# Patient Record
Sex: FEMALE | Race: WHITE | Employment: UNEMPLOYED | URBAN - METROPOLITAN AREA
[De-identification: names, ages, dates, MRNs, and addresses within clinical notes are randomized per-mention and may not be internally consistent; named-entity substitution may affect disease eponyms.]

---

## 2021-10-02 ENCOUNTER — APPOINTMENT (OUTPATIENT)
Dept: GENERAL RADIOLOGY | Age: 31
End: 2021-10-02
Payer: COMMERCIAL

## 2021-10-02 ENCOUNTER — HOSPITAL ENCOUNTER (EMERGENCY)
Age: 31
Discharge: HOME | End: 2021-10-02
Attending: EMERGENCY MEDICINE
Payer: COMMERCIAL

## 2021-10-02 VITALS
HEIGHT: 62 IN | OXYGEN SATURATION: 99 % | HEART RATE: 99 BPM | BODY MASS INDEX: 26.68 KG/M2 | TEMPERATURE: 98.7 F | RESPIRATION RATE: 16 BRPM | DIASTOLIC BLOOD PRESSURE: 87 MMHG | SYSTOLIC BLOOD PRESSURE: 126 MMHG | WEIGHT: 145 LBS

## 2021-10-02 DIAGNOSIS — R31.9 HEMATURIA, UNSPECIFIED TYPE: Primary | ICD-10-CM

## 2021-10-02 LAB
-: ABNORMAL
AMORPHOUS: ABNORMAL
BACTERIA: ABNORMAL
BILIRUBIN URINE: NEGATIVE
CASTS UA: ABNORMAL /LPF
COLOR: YELLOW
COMMENT UA: ABNORMAL
CRYSTALS, UA: ABNORMAL /HPF
EPITHELIAL CELLS UA: ABNORMAL /HPF (ref 0–5)
GLUCOSE URINE: NEGATIVE
HCG(URINE) PREGNANCY TEST: NEGATIVE
KETONES, URINE: NEGATIVE
LEUKOCYTE ESTERASE, URINE: NEGATIVE
MUCUS: ABNORMAL
NITRITE, URINE: NEGATIVE
OTHER OBSERVATIONS UA: ABNORMAL
PH UA: 6 (ref 5–8)
PROTEIN UA: NEGATIVE
RBC UA: ABNORMAL /HPF (ref 0–2)
RENAL EPITHELIAL, UA: ABNORMAL /HPF
SPECIFIC GRAVITY UA: 1.02 (ref 1–1.03)
TRICHOMONAS: ABNORMAL
TURBIDITY: CLEAR
URINE HGB: ABNORMAL
UROBILINOGEN, URINE: ABNORMAL
WBC UA: ABNORMAL /HPF (ref 0–5)
YEAST: ABNORMAL

## 2021-10-02 PROCEDURE — 81001 URINALYSIS AUTO W/SCOPE: CPT

## 2021-10-02 PROCEDURE — 99284 EMERGENCY DEPT VISIT MOD MDM: CPT

## 2021-10-02 PROCEDURE — 81025 URINE PREGNANCY TEST: CPT

## 2021-10-02 PROCEDURE — 74018 RADEX ABDOMEN 1 VIEW: CPT

## 2021-10-02 RX ORDER — NITROFURANTOIN 25; 75 MG/1; MG/1
100 CAPSULE ORAL 2 TIMES DAILY
Qty: 14 CAPSULE | Refills: 0 | Status: SHIPPED | OUTPATIENT
Start: 2021-10-02 | End: 2021-10-09

## 2021-10-02 ASSESSMENT — ENCOUNTER SYMPTOMS
DIARRHEA: 0
VOMITING: 0
BACK PAIN: 0
SHORTNESS OF BREATH: 0

## 2021-10-02 ASSESSMENT — PAIN SCALES - GENERAL: PAINLEVEL_OUTOF10: 0

## 2021-10-02 NOTE — ED PROVIDER NOTES
ATTENDING  ADDENDUM     Care of this patient was assumed from Dr. Tonie Martin. The patient was seen for Hematuria (hematuria since august. denies any pain. reports sometimes has trouble emptying bladder fully. was seen at urgent care today and told to go to ER for possible kidney stone.)  . The patient's initial evaluation and plan have been discussed with the prior provider who initially evaluated the patient. Nursing Notes, Past Medical Hx, Past Surgical Hx, Social Hx, Allergies, and Family Hx were all reviewed. ED COURSE      The patient was given the following medications:  Orders Placed This Encounter   Medications    nitrofurantoin, macrocrystal-monohydrate, (MACROBID) 100 MG capsule     Sig: Take 1 capsule by mouth 2 times daily for 7 days     Dispense:  14 capsule     Refill:  0       RECENT VITALS:   Temp: 98.7 °F (37.1 °C), Pulse: 99, Resp: 16, BP: 126/87    MEDICAL DECISION MAKING       40-year-old female presented to the emergency department for evaluation of hematuria. UA does show hematuria. KUB shows no stones. Plan for treatment for urinary tract infection with Macrobid. Close follow-up with her primary care doctor.     Impression: Hematuria      Gogo Waller MD  Emergency Medicine Attending        Gogo Waller MD  10/02/21 65 WARREN. Sanjay Bah MD  10/02/21 [id number]

## 2021-10-02 NOTE — ED PROVIDER NOTES
[address] [facility] ED  [address] [facility] [address]  Phone: [phone]  Fax: [address] [facility]  [place]      Pt Name: Christophe Kumar  MRN: [id number]  [place] 1990  Date of evaluation: 10/2/2021  Provider: Indu Atkinson, [facility]       Chief Complaint   Patient presents with    Hematuria     hematuria since august. denies any pain. reports sometimes has trouble emptying bladder fully. was seen at urgent care today and told to go to ER for possible kidney stone. HISTORY OF PRESENT ILLNESS   (Location/Symptom, Timing/Onset,Context/Setting, Quality, Duration, Modifying Factors, Severity)  Note limiting factors. Christophe Kumar is a 27 y.o. female who presents to the emergency department for the evaluation of some blood in her urine. Patient states she feels she has been seeing this since August.  She states when she sits down on the toilet she feels like it takes a long time to start her urine stream.  She does not have any dysuria. She has no abdominal pain or flank pain. She has no back pain. No concern for pregnancy or STD. She has no nausea, vomiting or diarrhea. She was seen at urgent care, apparently she had some blood in her urine without signs of infection and they sent her to the ER    Nursing Notes were reviewed. REVIEW OF SYSTEMS    (2-9systems for level 4, 10 or more for level 5)     Review of Systems   Respiratory: Negative for shortness of breath. Cardiovascular: Negative for chest pain. Gastrointestinal: Negative for diarrhea and vomiting. Genitourinary: Positive for hematuria. Negative for dysuria. Hesitancy   Musculoskeletal: Negative for back pain. Skin: Negative for rash. Neurological: Negative for weakness. All other systems reviewed and are negative. Except asnoted above the remainder of the review of systems was reviewed and negative.        PAST MEDICAL level 5)     ED Triage Vitals   BP Temp Temp Source Pulse Resp SpO2 Height Weight   10/02/21 1819 10/02/21 1832 10/02/21 1832 10/02/21 1819 10/02/21 1819 10/02/21 1819 10/02/21 1832 10/02/21 1832   126/87 98.7 °F (37.1 °C) Oral 99 16 99 % 5' 2\" (1.575 m) 145 lb (65.8 kg)       Physical Exam  Vitals and nursing note reviewed. Constitutional:       General: She is not in acute distress. Appearance: Normal appearance. She is not ill-appearing or toxic-appearing. HENT:      Head: Normocephalic and atraumatic. Nose: Nose normal. No congestion. Mouth/Throat:      Mouth: Mucous membranes are moist.   Eyes:      General:         Right eye: No discharge. Left eye: No discharge. Conjunctiva/sclera: Conjunctivae normal.   Cardiovascular:      Rate and Rhythm: Normal rate and regular rhythm. Pulses: Normal pulses. Heart sounds: Normal heart sounds. No murmur heard. Pulmonary:      Effort: Pulmonary effort is normal. No respiratory distress. Breath sounds: Normal breath sounds. No wheezing. Abdominal:      General: Abdomen is flat. There is no distension. Palpations: Abdomen is soft. Tenderness: There is no abdominal tenderness. Musculoskeletal:         General: No deformity or signs of injury. Normal range of motion. Cervical back: Normal range of motion. Skin:     General: Skin is warm and dry. Capillary Refill: Capillary refill takes less than 2 seconds. Findings: No rash. Neurological:      General: No focal deficit present. Mental Status: She is alert. Mental status is at baseline. Motor: No weakness. Comments: Speaking normally. No facial asymmetry. Moving all 4 extremities. Normal gait.     Psychiatric:         Mood and Affect: Mood normal.         EMERGENCY DEPARTMENT COURSE and DIFFERENTIAL DIAGNOSIS/MDM:   Vitals:    Vitals:    10/02/21 1819 10/02/21 1832   BP: 126/87    Pulse: 99    Resp: 16    Temp:  98.7 °F (37.1 °C) TempSrc:  Oral   SpO2: 99%    Weight:  145 lb (65.8 kg)   Height:  5' 2\" (1.575 m)       Patient presents to the emergency department with a complaint described above. Vitals are normal, she is nontoxic and she is resting comfortably. The symptoms have been going on for approximately 4 or 5 months. She has absolutely no pain or other symptoms. I have very low suspicion this represents ureterolithiasis. I am going to repeat UA so that I can get a culture and I am going to get a KUB to rule out any obvious stone or abnormality      Signed out to oncoming doc pending results of urine and x-ray           PROCEDURES:  Unless otherwise noted below, none     Procedures    FINAL IMPRESSION      1.  Hematuria, unspecified type          DISPOSITION/PLAN   DISPOSITION        PATIENT REFERRED TO:  Elmer Peabody, MD  [address] [place] [address] Lacho Funez Mor[address] 3 [phone]    In 3 days        DISCHARGE MEDICATIONS:  Discharge Medication List as of 10/2/2021  7:26 PM      START taking these medications    Details   nitrofurantoin, macrocrystal-monohydrate, (MACROBID) 100 MG capsule Take 1 capsule by mouth 2 times daily for 7 days, Disp-14 capsule, R-0Normal                (Please note that portions of this note were completed with a voice recognition program.  Efforts were made to edit the dictations but occasionally words are mis-transcribed.)    Kenyetta Thompson DO (electronically signed)  Board Certified Emergency Physician         Kenyetta Thompson DO  10/03/21 1959

## 2024-09-26 ENCOUNTER — OFFICE VISIT (OUTPATIENT)
Age: 34
End: 2024-09-26
Payer: COMMERCIAL

## 2024-09-26 DIAGNOSIS — M25.552 BILATERAL HIP PAIN: ICD-10-CM

## 2024-09-26 DIAGNOSIS — R35.0 URINARY FREQUENCY: ICD-10-CM

## 2024-09-26 DIAGNOSIS — M53.3 SACROILIAC JOINT DYSFUNCTION: ICD-10-CM

## 2024-09-26 DIAGNOSIS — R10.2 PELVIC PAIN SYNDROME: Primary | ICD-10-CM

## 2024-09-26 DIAGNOSIS — M25.551 BILATERAL HIP PAIN: ICD-10-CM

## 2024-09-26 PROCEDURE — 97530 THERAPEUTIC ACTIVITIES: CPT

## 2024-09-26 PROCEDURE — 97161 PT EVAL LOW COMPLEX 20 MIN: CPT

## 2024-09-26 NOTE — PATIENT INSTRUCTIONS
from side to side or downwards towards rectum in the vaginal canal and practice gently removing and re-inserting the dilator into the vaginal canal.    Imagine your vaginal opening like a clock where 12 o clock is your clitoris and 6 o clock is your rectum. Practice gently pressure (no greater than what you would place on a tomato to avoid breaking the skin) and stretch along 3, 6, 9 o clock, can hold each position gently for 30-60 seconds and should feel resistance start to lesson.    Practice this for 5 min. May start or end session with this pending on comfort level.     Advancing   When you are able to insert the dilator fully for 10-15 minutes, you should advance to the next larger size and repeat the process   It may help to start with the size you are comfortable with, and insert it for 5 minutes, to allow your muscles to relax. After 5 minutes, attempt to insert the next larger size, and follow the method above.    The time needed to advance is individual; sometimes, advancements can be made in 1-week intervals and sometimes it takes 2 or 3 weeks longer   Your healthcare practitioner will assist in deciding when the size should be advanced   Movement can also be introduced; hold onto the end of the dilator and move it slowly and gently in and out    Points to remember   You are in control of the dilator; go at your own pace when you are ready   Use adequate lubrication   [place] with different leg and trunk positions as well as angles of insertion to find the best combination   Slow movement is usually best    Cleaning the Dilator   Wash under running water, using mild dish soap before and after each use   Do not use boiling water   Dry with paper towel; store in a clean plastic bag    Please contact your therapist if you have questions or concerns       Dilator options for purchase:     Amielle dilator set  Progressive set (5 sizes), has a handle for better maneuverability   Medical grade plastic   About
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
## 2024-10-01 ENCOUNTER — EVALUATION (OUTPATIENT)
Age: 34
End: 2024-10-01
Payer: COMMERCIAL

## 2024-10-01 DIAGNOSIS — M25.552 BILATERAL HIP PAIN: ICD-10-CM

## 2024-10-01 DIAGNOSIS — R10.2 PELVIC PAIN SYNDROME: Primary | ICD-10-CM

## 2024-10-01 DIAGNOSIS — R35.0 URINARY FREQUENCY: ICD-10-CM

## 2024-10-01 DIAGNOSIS — M53.3 SACROILIAC JOINT DYSFUNCTION: ICD-10-CM

## 2024-10-01 DIAGNOSIS — M25.551 BILATERAL HIP PAIN: ICD-10-CM

## 2024-10-01 PROCEDURE — 97140 MANUAL THERAPY 1/> REGIONS: CPT

## 2024-10-01 PROCEDURE — 97110 THERAPEUTIC EXERCISES: CPT

## 2024-10-01 NOTE — PROGRESS NOTES
[facility] PHYSICIANS [facility] UROGYNECOLOGY AND PELVIC REHABILITATION   [address] [place] [address]  Dept: [phone]     Physical Therapy Daily Treatment Note    Date:  10/1/2024  Patient Name:  Alissa Espitia    :  1990  MRN: [id number]  Physician: CLARICE Early-CNP                              Insurance: [facility] (auth expires at 12/10/24)   Medical Diagnosis/Rehab Codes:        Encounter Diagnoses   Name Primary?    Pelvic pain syndrome Yes    Urinary frequency      Bilateral hip pain      Sacroiliac joint dysfunction           Onset Date: 2023                                    Next 's appt: 2024  Visit# / total visits:  ** only 12 units approved total for each code     Cancels/No Shows: 0     Subjective:    Pain:  [x] Yes  [] No Location: bilateral hips, SI and low back with R worse than L  Pain Rating: (0-10 scale) not rated/10  Pain altered Tx:  [] No  [x] Yes  Action: fearful of movement, very guarded     Today, Alissa reports that she did try the exercises at least 1x per day. She feels like it causes pain at times and soreness, so unsure if its helping.     She did try with dilators (V- well) silicone based dilators (progressive set).     Progress made: none yet   Persistent symptoms: se  New symptoms: none    Alissa reports that she did try the dilators once following last appointment, and endorses good compliance with home exercise program. Alissa no adverse response to prescribed home exercises/activities.     Plan for today's session was verbally explained to patient along with clinical rationale. Alissa  verbalized agreement with today's plan prior to the initiation of treatment.    Objective:  Modalities:   Precautions:  Intervention:  Intervention  Reps/ Time Weight/ Level Comments   Education  X  Dilator education with use of model for explanation     Discussed

## 2024-10-08 ENCOUNTER — EVALUATION (OUTPATIENT)
Age: 34
End: 2024-10-08
Payer: COMMERCIAL

## 2024-10-08 DIAGNOSIS — M25.552 BILATERAL HIP PAIN: ICD-10-CM

## 2024-10-08 DIAGNOSIS — M53.3 SACROILIAC JOINT DYSFUNCTION: ICD-10-CM

## 2024-10-08 DIAGNOSIS — M25.551 BILATERAL HIP PAIN: ICD-10-CM

## 2024-10-08 DIAGNOSIS — R35.0 URINARY FREQUENCY: ICD-10-CM

## 2024-10-08 DIAGNOSIS — R10.2 PELVIC PAIN SYNDROME: Primary | ICD-10-CM

## 2024-10-08 PROCEDURE — 97110 THERAPEUTIC EXERCISES: CPT

## 2024-10-08 PROCEDURE — 97140 MANUAL THERAPY 1/> REGIONS: CPT

## 2024-10-08 NOTE — PROGRESS NOTES
[facility] PHYSICIANS [facility] UROGYNECOLOGY AND PELVIC REHABILITATION   [address] [place] [address]  Dept: [phone]     Physical Therapy Daily Treatment Note    Date:  10/8/2024  Patient Name:  Alissa Espitia    :  1990  MRN: [id number]  Physician: CLARICE Early-GAIL                              Insurance: [facility] (auth expires at 12/10/24)   Medical Diagnosis/Rehab Codes:        Encounter Diagnoses   Name Primary?    Pelvic pain syndrome Yes    Urinary frequency      Bilateral hip pain      Sacroiliac joint dysfunction           Onset Date: 2023                                    Next 's appt: 2024  Visit# / total visits: 3/12 ** only 12 units approved total for each code     Cancels/No Shows: 0     Subjective:    Pain:  [x] Yes  [] No Location: bilateral hips, SI and low back with R worse than L  Pain Rating: (0-10 scale) 4/10  Pain altered Tx:  [] No  [x] Yes  Action: fearful of movement, very guarded     Today, Alissa reports that she feels like that she feels like there has been no change yet, but feels like she is moving in the right direction. She reports there was some soreness post correction, but improved next day.     she did try the exercises at least 1x per day. She feels like it causes pain at times and soreness, so unsure if its helping.     She did try with dilators (V- well) silicone based dilators (progressive set); has not been able to perform due to having her menstruation currently. She did do the cardio drumming for the first time ever yesterday and it went well but had significant pain in her R anterolateral hip with HS stretch ( standing).     Progress made: no big changes yet, but feeling like she is moving in the right direction   Persistent symptoms: significant SI, bilateral hips with R worse than L   New symptoms: none    Alissa reports that she has been

## 2024-10-14 ENCOUNTER — EVALUATION (OUTPATIENT)
Age: 34
End: 2024-10-14
Payer: COMMERCIAL

## 2024-10-14 DIAGNOSIS — M25.552 BILATERAL HIP PAIN: ICD-10-CM

## 2024-10-14 DIAGNOSIS — M25.551 BILATERAL HIP PAIN: ICD-10-CM

## 2024-10-14 DIAGNOSIS — M53.3 SACROILIAC JOINT DYSFUNCTION: ICD-10-CM

## 2024-10-14 DIAGNOSIS — R35.0 URINARY FREQUENCY: ICD-10-CM

## 2024-10-14 DIAGNOSIS — R10.2 PELVIC PAIN SYNDROME: Primary | ICD-10-CM

## 2024-10-14 PROCEDURE — 97140 MANUAL THERAPY 1/> REGIONS: CPT

## 2024-10-14 PROCEDURE — 97530 THERAPEUTIC ACTIVITIES: CPT

## 2024-10-14 NOTE — PROGRESS NOTES
[facility]VIC REHABILITATION   [address] [place] [address]  [address] [place] [address]  Dept: [phone]     Physical Therapy Daily Treatment Note    Date:  10/14/2024  Patient Name:  Alissa Espitia    :  1990  MRN: [id number]  Physician: CLARICE Early-GAIL                              Insurance: [facility] (auth expires at 12/10/24)   Medical Diagnosis/Rehab Codes:        Encounter Diagnoses   Name Primary?    Pelvic pain syndrome Yes    Urinary frequency      Bilateral hip pain      Sacroiliac joint dysfunction           Onset Date: 2023                                    Next 's appt: 2024  Visit# / total visits:  ** only 12 units approved total for each code     Cancels/No Shows: 0       DIAGNOSIS   Diagnosis Orders   1. Pelvic pain syndrome        2. Urinary frequency        3. Bilateral hip pain        4. Sacroiliac joint dysfunction                Subjective:      Pt states she is having surgery 10/18 and will find out how long she has to be of PFT after procedure.    Pt has tried using the dilator and still seems very tight.  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  Pt states she has seen a chiropractor before and PT and no help (pt was given information on Peggy Bartholomew to ask about a torn labrum as well)    Objective:    Moderate tightenss noted at bilateral LB, psis, piriformis, quadratus and paraspinals    Instructed in after care and possible side effects of cupping      Treatment Charges: Mins Units   []  Modalities     [x]  Ther Exercise 23 2   [x]  Manual Therapy 30 2   []  Neuromuscular re-education     []  Self Pay     Total Treatment time 53 4   12 units for each code LIMIT until 12/10/24:   Approved codes (Keep track of units served and update for each

## 2024-11-04 ENCOUNTER — EVALUATION (OUTPATIENT)
Age: 34
End: 2024-11-04
Payer: COMMERCIAL

## 2024-11-04 DIAGNOSIS — R35.0 URINARY FREQUENCY: ICD-10-CM

## 2024-11-04 DIAGNOSIS — M25.551 BILATERAL HIP PAIN: ICD-10-CM

## 2024-11-04 DIAGNOSIS — M53.3 SACROILIAC JOINT DYSFUNCTION: ICD-10-CM

## 2024-11-04 DIAGNOSIS — R10.2 PELVIC PAIN SYNDROME: Primary | ICD-10-CM

## 2024-11-04 DIAGNOSIS — M25.552 BILATERAL HIP PAIN: ICD-10-CM

## 2024-11-04 PROCEDURE — 97140 MANUAL THERAPY 1/> REGIONS: CPT

## 2024-11-04 PROCEDURE — 97530 THERAPEUTIC ACTIVITIES: CPT

## 2024-11-04 NOTE — PROGRESS NOTES
[facility] PHYSICIANS [facility] UROGYNECOLOGY AND PELVIC REHABILITATION   [address] [place] [address]  [address] [place] [address]  Dept: [phone]     Physical Therapy Daily Treatment Note    Date:  2024  Patient Name:  Alissa Espitia    :  1990  MRN: [id number]  Physician: CLARICE Early-GAIL                              Insurance: [facility] (auth expires at 12/10/24)   Medical Diagnosis/Rehab Codes:        Encounter Diagnoses   Name Primary?    Pelvic pain syndrome Yes    Urinary frequency      Bilateral hip pain      Sacroiliac joint dysfunction           Onset Date: 2023                                    Next 's appt: 2024  Visit# / total visits:  ** only 12 units approved total for each code     Cancels/No Shows: 0       DIAGNOSIS       Diagnosis Orders   1. Pelvic pain syndrome        2. Urinary frequency        3. Bilateral hip pain        4. Sacroiliac joint dysfunction                Subjective:      Pt states had a procedure done on 10/18 and they found hyperplasia but it was benign and no mass was found.     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  Pt has called Dr Bartholomew office but did not get a call back yet will try again today.  Pt states pain has increased some since procedure but was given the clear to come back to PT.  Pt will check with  Regarding clearance for cupping.    Objective:    Moderate tightenss noted at bilateral LB, psis, piriformis, quadratus and paraspinals bilateral upper and lower abdomen with MFR performed.     Instructed in after care and possible side effects of cupping      Treatment Charges: Mins Units   []  Modalities     [x]  Ther Exercise 15 1   [x]  Manual Therapy 30 2   []  Neuromuscular re-education     []  Self Pay     Total Treatment time 45 3   12 units for

## 2024-11-11 ENCOUNTER — EVALUATION (OUTPATIENT)
Age: 34
End: 2024-11-11
Payer: COMMERCIAL

## 2024-11-11 DIAGNOSIS — M53.3 SACROILIAC JOINT DYSFUNCTION: ICD-10-CM

## 2024-11-11 DIAGNOSIS — R35.0 URINARY FREQUENCY: ICD-10-CM

## 2024-11-11 DIAGNOSIS — M25.551 BILATERAL HIP PAIN: ICD-10-CM

## 2024-11-11 DIAGNOSIS — M25.552 BILATERAL HIP PAIN: ICD-10-CM

## 2024-11-11 DIAGNOSIS — R10.2 PELVIC PAIN SYNDROME: Primary | ICD-10-CM

## 2024-11-11 PROCEDURE — 97530 THERAPEUTIC ACTIVITIES: CPT

## 2024-11-11 PROCEDURE — 97140 MANUAL THERAPY 1/> REGIONS: CPT

## 2024-11-11 NOTE — PROGRESS NOTES
LIMIT until 12/10/24:   Approved codes (Keep track of units served and update for each visit) 11/11/2024   1) there ex (34640):8/12  2) Neuro re-ed (74958): 0/12  3) Manual (10500): 9/12      Assessment: Pt's reponse today's intervention was good.Pt had moderate tightness and tenderness noted across bilateral LB/buttocks region started at 4/10 and was a 0-1/10 after treatment.  Pt was instructed in after care and possible side effects of MFR.   Pt will benefit from skilled PT intervention to address musculoskeletal deficits and pain and pelvic pain/vaginismus.     LTG ( to be met in 12-16 treatments):  ALL GOALS PROGRESSING   9/26/2024 Pt to report pain 2/10 on average for return to normal function.   9/26/2024 Pt to have pain less than 4/10 with internal palpation of levator ani to improve ability to participate intercourse.  9/26/2024 Pt to relax pelvic floor musculature upon command measured by therapist palpation and descent of perineum to sitz bone.   9/26/2024 Pt to demonstrate proper diaphragmatic breathing to increase ability to move pelvic floor musculature.   9/26/2024 Pt to demonstrate independence with home exercise program to maximize functional gains made through physical therapy intervention.   9/26/2024  Pt to report pain free vaginal intercourse to resume prior level of sexual activity.         Patient goals: \"to go to the bathroom easier, eliminate the debilitating hip pain\"      Plan: [x] Continue current frequency toward long and short term goals.    [x] Specific Instructions for subsequent treatments: cupping to glutes/paraspinals, hip flexor bilatearlly and TFL bilaterally;  pubic correction- R sided , internal vaginal MFR as needed, postural correction      Time In: 12:30pm       Time Out: 1:15pm    Electronically signed by:  Eulalia Garnica PTA

## 2024-11-18 ENCOUNTER — EVALUATION (OUTPATIENT)
Age: 34
End: 2024-11-18
Payer: COMMERCIAL

## 2024-11-18 DIAGNOSIS — M53.3 SACROILIAC JOINT DYSFUNCTION: ICD-10-CM

## 2024-11-18 DIAGNOSIS — R35.0 URINARY FREQUENCY: ICD-10-CM

## 2024-11-18 DIAGNOSIS — M25.551 BILATERAL HIP PAIN: ICD-10-CM

## 2024-11-18 DIAGNOSIS — M25.552 BILATERAL HIP PAIN: ICD-10-CM

## 2024-11-18 DIAGNOSIS — R10.2 PELVIC PAIN SYNDROME: Primary | ICD-10-CM

## 2024-11-18 PROCEDURE — 97530 THERAPEUTIC ACTIVITIES: CPT

## 2024-11-18 PROCEDURE — 97140 MANUAL THERAPY 1/> REGIONS: CPT

## 2024-11-18 NOTE — PROGRESS NOTES
Neuro re-ed (86322): 0/12  3) Manual (24299): 10/12      Assessment: Pt's reponse today's intervention was good.Pt had moderate tightness and tenderness noted across bilateral LB/buttocks region started at 4/10 and was a 0-1/10 after treatment.  Pt was instructed in after care and possible side effects of MFR.   Pt will benefit from skilled PT intervention to address musculoskeletal deficits and pain and pelvic pain/vaginismus.     LTG ( to be met in 12-16 treatments):  ALL GOALS PROGRESSING   9/26/2024 Pt to report pain 2/10 on average for return to normal function.   9/26/2024 Pt to have pain less than 4/10 with internal palpation of levator ani to improve ability to participate intercourse.  9/26/2024 Pt to relax pelvic floor musculature upon command measured by therapist palpation and descent of perineum to sitz bone.   9/26/2024 Pt to demonstrate proper diaphragmatic breathing to increase ability to move pelvic floor musculature.   9/26/2024 Pt to demonstrate independence with home exercise program to maximize functional gains made through physical therapy intervention.   9/26/2024  Pt to report pain free vaginal intercourse to resume prior level of sexual activity.         Patient goals: \"to go to the bathroom easier, eliminate the debilitating hip pain\"      Plan: [x] Continue current frequency toward long and short term goals.    [x] Specific Instructions for subsequent treatments: cupping to glutes/paraspinals, hip flexor bilatearlly and TFL bilaterally;  pubic correction- R sided , internal vaginal MFR as needed, postural correction      Time In: 12:20pm       Time Out: 1:10pm    Electronically signed by:  Eulalia Garnica PTA

## 2024-12-02 ENCOUNTER — EVALUATION (OUTPATIENT)
Age: 34
End: 2024-12-02

## 2024-12-02 DIAGNOSIS — R10.2 PELVIC PAIN SYNDROME: Primary | ICD-10-CM

## 2024-12-02 DIAGNOSIS — R35.0 URINARY FREQUENCY: ICD-10-CM

## 2024-12-02 DIAGNOSIS — M25.551 BILATERAL HIP PAIN: ICD-10-CM

## 2024-12-02 DIAGNOSIS — M25.552 BILATERAL HIP PAIN: ICD-10-CM

## 2024-12-02 DIAGNOSIS — M53.3 SACROILIAC JOINT DYSFUNCTION: ICD-10-CM

## 2024-12-02 NOTE — PROGRESS NOTES
[facility], [facility] UROGYNECOLOGY AND PELVIC REHABILITATION   [address] [place] [address]  [address] [place] [address]  Dept: [phone]     Physical Therapy Daily Treatment Note    Date:  2024  Patient Name:  Alissa Espitia    :  1990  MRN: [id number]  Physician: Socorro Adan APRN-CNP                              Insurance: [facility] (auth expires at 12/10/24)   Onset Date: 2023                                    Next 's appt: 2024  Visit# / total visits:  ** only 12 units approved total for each code     Cancels/No Shows: 0     DIAGNOSIS     Diagnosis Orders   1. Pelvic pain syndrome        2. Urinary frequency        3. Bilateral hip pain        4. Sacroiliac joint dysfunction                Subjective:       Right hip and LB continue to be bothersome, However pt states she has noticed improvement.  Pt saw Dr Bartholomew and will return after she reviews her xrays and c-t scans which she has not called to check on yet but will today was able to go 2 weeks before treatment and pain didn't start to come back till late last week.          Objective:    Moderate tightenss noted at bilateral LB, psis, piriformis, quadratus and paraspinals bilateral upper and lower abdomen with MFR performed.     Instructed in after care and possible side effects of cupping    Reviewed stretches as patient has not been consistent with these.        Treatment Charges: Mins Units   []  Modalities     [x]  Ther Exercise 15 1   [x]  Manual Therapy 15 1   []  Neuromuscular re-education     []  Self Pay     Total Treatment time 30 2   12 units for each code LIMIT until 12/10/24:   Approved codes (Keep track of units served and update for each visit) 2024   1) there ex (68864)  2) Neuro re-ed (81943):   3) Manual (37224):       Assessment: Pt's reponse today's intervention was good.Pt had moderate tightness and tenderness

## 2024-12-10 ENCOUNTER — EVALUATION (OUTPATIENT)
Age: 34
End: 2024-12-10

## 2024-12-10 DIAGNOSIS — M53.3 SACROILIAC JOINT DYSFUNCTION: ICD-10-CM

## 2024-12-10 DIAGNOSIS — M25.551 BILATERAL HIP PAIN: ICD-10-CM

## 2024-12-10 DIAGNOSIS — R35.0 URINARY FREQUENCY: ICD-10-CM

## 2024-12-10 DIAGNOSIS — M25.552 BILATERAL HIP PAIN: ICD-10-CM

## 2024-12-10 DIAGNOSIS — R10.2 PELVIC PAIN SYNDROME: Primary | ICD-10-CM

## 2024-12-10 NOTE — PATIENT INSTRUCTIONS
Sexual health considerations:     -  Increase foreplay  - 1 inch of penile penetration per 1 min   - use silicone organic based lubrication  - orgasm prior to penetration  - consider missionary with pillow under pelvis, lying on side and either facing partner or away, or positions with woman on top to improve control/speed and depth of penetration   - squeeze around penis during entry at maximal effort quickly and release a few times as well can help reduce pain.       Try hip flexor stretches  - pigen pose  - 1/2 kneeling pose    After intercourse:   Lie in butterfly position and work on 5 breaths and relax pelvic floor  Sit to stand or pour water over vulva when trying to pee or lean forward and rest arms on knees

## 2024-12-10 NOTE — PROGRESS NOTES
this plan of care and certify a need for medically necessary rehabilitation services.     *PLEASE SIGN ABOVE AND FAX BACK ALL PAGES*

## 2025-01-07 ENCOUNTER — EVALUATION (OUTPATIENT)
Age: 35
End: 2025-01-07
Payer: COMMERCIAL

## 2025-01-07 DIAGNOSIS — M53.3 SACROILIAC JOINT DYSFUNCTION: ICD-10-CM

## 2025-01-07 DIAGNOSIS — M25.551 BILATERAL HIP PAIN: ICD-10-CM

## 2025-01-07 DIAGNOSIS — R10.2 PELVIC PAIN SYNDROME: Primary | ICD-10-CM

## 2025-01-07 DIAGNOSIS — R35.0 URINARY FREQUENCY: ICD-10-CM

## 2025-01-07 DIAGNOSIS — M25.552 BILATERAL HIP PAIN: ICD-10-CM

## 2025-01-07 PROCEDURE — 97140 MANUAL THERAPY 1/> REGIONS: CPT

## 2025-01-07 PROCEDURE — 97110 THERAPEUTIC EXERCISES: CPT

## 2025-01-07 NOTE — PATIENT INSTRUCTIONS
Child pose (at least 5x for 30 s hold)- make sure you breathe     Colon massage (at least 1x per day)   COLON Massage    Abdominal massage, also called external colon massage, is a self-treatment circular massage technique that can reduce and eliminate gas and ease constipation.  The colon naturally contracts in waves in a clockwise direction starting from inside the right hip, moving up toward the ribs, across the belly, and down inside the left hip.   When you perform circular abdominal massage, you help stimulate your colon's normal wave pattern of movement called peristalsis.  It is most beneficial when done after eating.    Positioning  You can practice abdominal massage with oil while lying down, or in the shower with soap.  Some people find that if is just as effective to do the massage through clothing while sitting or standing.    How to Massage  Start by placing your finger tips or knuckles on your right side, just inside your hip bone.  Make small circular movements while you move upward toward your rib cage.  Once you reach the bottom right side of the rib cage, take your circular movements across to the left side of the bottom of your rib cage.  Next, move downward until you reach the inside of your left hip bone.  This is the path your feces travel in your colon.  Continue to perform your abdominal massage in this pattern for 10 minutes each day.    You can apply as much pressure as is comfortable in your massage.  Start gently and build pressure as you continue to practice.  Notice any areas of pain as you massage, areas of slight pain may be relieved as you massage, but if you have areas of significant or intense pain, consult with your healthcare provider.    Other Considerations  General physical activity including bending and stretching can have a beneficial massage - like effect on the colon.  Deep breathing can also stimulate the colon because breathing deeply activates the same nervous system
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
## 2025-01-14 ENCOUNTER — EVALUATION (OUTPATIENT)
Age: 35
End: 2025-01-14

## 2025-01-14 DIAGNOSIS — R10.2 PELVIC PAIN SYNDROME: Primary | ICD-10-CM

## 2025-01-14 DIAGNOSIS — R35.0 URINARY FREQUENCY: ICD-10-CM

## 2025-01-14 DIAGNOSIS — M53.3 SACROILIAC JOINT DYSFUNCTION: ICD-10-CM

## 2025-01-14 DIAGNOSIS — M25.551 BILATERAL HIP PAIN: ICD-10-CM

## 2025-01-14 DIAGNOSIS — M25.552 BILATERAL HIP PAIN: ICD-10-CM
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
## 2025-01-14 NOTE — PATIENT INSTRUCTIONS
For bowel movements  1) add in a warm liquid in the AM first hour of wake up   2) trying have a BM about 20 min to 30 min after a meal (2x per day)  3) Right leg over the left either crossed for figure 4 and pull into IR when having a BM  4)       Bowel program:  REMINDER   Have breakfast at same time each day.  Chew and swallow your breakfast and have hot drink within 1 hour of waking.   After eating - get up and be mobile for 15-20 min and then sit on the toilet to give yourself an opportunity to have a BM.    Have your feet on the step stool (or right knee to chest)  DO NOT SPEND any longer than 1-2 minutes trying to have a bowel movement - if nothing happens - get up and move on with your day     ** If able to go - “shush to push” to completely evacuate**   ** Do not hold breath and push, exhale as you bear down**    Aftercare instructions:  any of these symptoms may last for 24-48 hours after treatment  No hot pack or cold pack for 6 hours anywhere on the body   If you are light headed and/or nauseas or get a headache. Drink more water or electrolyte solution (ex water diluted Gatorade, LMNT, liquid IV) as we drain everything into your lymphatic system which can caused increased frequency with urination or increased sweating. It is not uncommon to see people significantly increase fluid intake and we recommend meeting your bodies demands. This is a NORMAL response.   If you are achy and you can take tylenol, motrin or ibuprofen if needed we are unable to prescribed it but will not interfere with treatment.  For females: you may see some spotting especially if you are close or at the end of your cycle. This is normal. Drink more water.

## 2025-02-03 ENCOUNTER — EVALUATION (OUTPATIENT)
Age: 35
End: 2025-02-03
Payer: COMMERCIAL

## 2025-02-03 DIAGNOSIS — R35.0 URINARY FREQUENCY: ICD-10-CM

## 2025-02-03 DIAGNOSIS — M25.551 BILATERAL HIP PAIN: ICD-10-CM

## 2025-02-03 DIAGNOSIS — M53.3 SACROILIAC JOINT DYSFUNCTION: ICD-10-CM

## 2025-02-03 DIAGNOSIS — M25.552 BILATERAL HIP PAIN: ICD-10-CM

## 2025-02-03 DIAGNOSIS — R10.2 PELVIC PAIN SYNDROME: Primary | ICD-10-CM

## 2025-02-03 PROCEDURE — 97140 MANUAL THERAPY 1/> REGIONS: CPT

## 2025-02-03 PROCEDURE — 97530 THERAPEUTIC ACTIVITIES: CPT

## 2025-02-03 NOTE — PROGRESS NOTES
[facility], [facility] UROGYNECOLOGY AND PELVIC REHABILITATION   [address] [place] [address]  [address] [place] [address]  Dept: [phone]     Date of Visit: 2/3/2025   Patient: Alissa Espitia   : 1990   Referring Physician:  CLARICE Early-CNP    Insurance: [facility]    Visit#:  12 of 16  Visit Diagnoses:   Diagnosis Orders   1. Pelvic pain syndrome        2. Urinary frequency        3. Bilateral hip pain        4. Sacroiliac joint dysfunction              Per communication received via [facility] on 24, there is no pre-auth required for PT for the codes CPT 74850 (manual therapy), 14366 (there ex), 50623 (neuro re-ed) - FAX IS SCANNED IN CHART    Subjective:  Alissa Espitia  (: 1990  is a 34 y.o.  y.o. female ,.Pt states she is still bleeding with intercourse and will see OBGYN this week.  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  Pt is still taking linzess and drinking warm liquid in the am and bowels are getting better slowly.  No bleeding noted with dilators.  Pt continues with lateral hip and LB pain       Objective:   Moderate tightness noted across bilateral LB, psis, piriformis and lateral hips    Reviewed HEP and stretches      Treatment:  Manual Therapy:  30 MIN  MFR/Cupping to bilateral LB, PSIS, piriformis, lateral hips  There-Act:  15 MIN  Reviewed POC and HEP.  Pt aware of after care and possible side effects of Cupping       Assessment:  Pt had moderate tightness and tenderness noted across bilateral LB/buttocks and lateral hips decreased after MFR/cupping.  Pt will continue to benefit from skilled PT to increase mobility and decrease pain and frequency       LTG ( to be met in 16 treatments):   Pt to report pain 2/10 on average for return to normal function.  5-6/10, progressing    Pt to have pain less than 4/10 with internal palpation of

## 2025-02-10 ENCOUNTER — EVALUATION (OUTPATIENT)
Age: 35
End: 2025-02-10
Payer: COMMERCIAL

## 2025-02-10 DIAGNOSIS — M53.3 SACROILIAC JOINT DYSFUNCTION: ICD-10-CM

## 2025-02-10 DIAGNOSIS — R35.0 URINARY FREQUENCY: ICD-10-CM

## 2025-02-10 DIAGNOSIS — M25.551 BILATERAL HIP PAIN: ICD-10-CM

## 2025-02-10 DIAGNOSIS — M25.552 BILATERAL HIP PAIN: ICD-10-CM

## 2025-02-10 DIAGNOSIS — R10.2 PELVIC PAIN SYNDROME: Primary | ICD-10-CM

## 2025-02-10 PROCEDURE — 97530 THERAPEUTIC ACTIVITIES: CPT

## 2025-02-10 PROCEDURE — 97140 MANUAL THERAPY 1/> REGIONS: CPT

## 2025-02-10 NOTE — PROGRESS NOTES
[facility], [facility] UROGYNECOLOGY AND PELVIC REHABILITATION   [address] [place] [address]  [address] [place] [address]  Dept: [phone]     Date of Visit: 2/10/2025   Patient: Alissa Espitia   : 1990   Referring Physician:  CLARICE Early-CNP    Insurance: [facility]    Visit#:  13 of 16  Visit Diagnoses:     Diagnosis Orders   1. Pelvic pain syndrome        2. Urinary frequency        3. Bilateral hip pain        4. Sacroiliac joint dysfunction                Per communication received via [facility] on 24, there is no pre-auth required for PT for the codes CPT 24167 (manual therapy), 89753 (there ex), 40352 (neuro re-ed) - FAX IS SCANNED IN CHART    Subjective:  Alissa Espitia  (: 1990  is a 34 y.o.  y.o. female ,.Pt states she is still bleeding with intercourse occasionally did see the OBGYN and no cream was offered to her.  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  Pt is still taking linzess and drinking warm liquid in the am and bowels are getting better slowly.  No bleeding noted with dilators.  Pt continues with lateral hip and LB pain       Objective:   Moderate tightness noted across bilateral LB, psis, piriformis and lateral hips and bilateral abdomen     Reviewed HEP and stretches      Treatment:  Manual Therapy:  30 MIN  MFR/Cupping to bilateral LB, PSIS, piriformis, lateral hips, bilateral upper and lower abdomen   There-Act:  15 MIN  Reviewed POC and HEP.  Pt aware of after care and possible side effects of Cupping     Aftercare instructions:  any of these symptoms may last for 24-48 hours after treatment  No hot pack or cold pack for 6 hours anywhere on the body   If you are light headed and/or nauseas or get a headache. Drink more water or electrolyte solution (ex water

## 2025-02-18 ENCOUNTER — EVALUATION (OUTPATIENT)
Age: 35
End: 2025-02-18
Payer: COMMERCIAL

## 2025-02-18 DIAGNOSIS — M25.551 BILATERAL HIP PAIN: ICD-10-CM

## 2025-02-18 DIAGNOSIS — R35.0 URINARY FREQUENCY: ICD-10-CM

## 2025-02-18 DIAGNOSIS — R10.2 PELVIC PAIN SYNDROME: Primary | ICD-10-CM

## 2025-02-18 DIAGNOSIS — M53.3 SACROILIAC JOINT DYSFUNCTION: ICD-10-CM

## 2025-02-18 DIAGNOSIS — M25.552 BILATERAL HIP PAIN: ICD-10-CM

## 2025-02-18 PROCEDURE — 97110 THERAPEUTIC EXERCISES: CPT

## 2025-02-18 PROCEDURE — 97530 THERAPEUTIC ACTIVITIES: CPT

## 2025-02-18 NOTE — PATIENT INSTRUCTIONS
Vacuum cupping machine or a set of regular cups (use the like the machine)     Vulvar balms:   1) good clean love: Restore   2) V magic - vulvar balm  3) Revaree- Bonafide   4) Reveleum by [place] harvest    *increase clitoral stimulation and foreplay to improve natural lubricant and blood flow when participating in multiple rounds of intimacy especially to help reduce dryness
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
